# Patient Record
Sex: FEMALE
[De-identification: names, ages, dates, MRNs, and addresses within clinical notes are randomized per-mention and may not be internally consistent; named-entity substitution may affect disease eponyms.]

---

## 2020-11-28 ENCOUNTER — HOSPITAL ENCOUNTER (INPATIENT)
Dept: HOSPITAL 92 - ERS | Age: 55
LOS: 5 days | Discharge: HOME | DRG: 177 | End: 2020-12-03
Attending: HOSPITALIST | Admitting: INTERNAL MEDICINE
Payer: COMMERCIAL

## 2020-11-28 VITALS — BODY MASS INDEX: 30 KG/M2

## 2020-11-28 DIAGNOSIS — Z79.82: ICD-10-CM

## 2020-11-28 DIAGNOSIS — E11.65: ICD-10-CM

## 2020-11-28 DIAGNOSIS — Z79.84: ICD-10-CM

## 2020-11-28 DIAGNOSIS — R19.7: ICD-10-CM

## 2020-11-28 DIAGNOSIS — J96.01: ICD-10-CM

## 2020-11-28 DIAGNOSIS — I10: ICD-10-CM

## 2020-11-28 DIAGNOSIS — E66.9: ICD-10-CM

## 2020-11-28 DIAGNOSIS — J12.89: ICD-10-CM

## 2020-11-28 DIAGNOSIS — U07.1: Primary | ICD-10-CM

## 2020-11-28 LAB
ALBUMIN SERPL BCG-MCNC: 3.5 G/DL (ref 3.5–5)
ALP SERPL-CCNC: 111 U/L (ref 40–110)
ALT SERPL W P-5'-P-CCNC: 16 U/L (ref 8–55)
ANION GAP SERPL CALC-SCNC: 25 MMOL/L (ref 10–20)
AST SERPL-CCNC: 24 U/L (ref 5–34)
BASOPHILS # BLD AUTO: 0.1 THOU/UL (ref 0–0.2)
BASOPHILS NFR BLD AUTO: 0.5 % (ref 0–1)
BILIRUB SERPL-MCNC: 0.6 MG/DL (ref 0.2–1.2)
BUN SERPL-MCNC: 15 MG/DL (ref 9.8–20.1)
CALCIUM SERPL-MCNC: 9.3 MG/DL (ref 7.8–10.44)
CHLORIDE SERPL-SCNC: 94 MMOL/L (ref 98–107)
CK SERPL-CCNC: 18 U/L (ref 29–168)
CO2 SERPL-SCNC: 21 MMOL/L (ref 22–29)
CREAT CL PREDICTED SERPL C-G-VRATE: 0 ML/MIN (ref 70–130)
EOSINOPHIL # BLD AUTO: 0.1 THOU/UL (ref 0–0.7)
EOSINOPHIL NFR BLD AUTO: 0.5 % (ref 0–10)
GLOBULIN SER CALC-MCNC: 5.4 G/DL (ref 2.4–3.5)
GLUCOSE SERPL-MCNC: 354 MG/DL (ref 70–105)
HGB BLD-MCNC: 17.1 G/DL (ref 12–16)
LIPASE SERPL-CCNC: 50 U/L (ref 8–78)
LYMPHOCYTES # BLD: 2.8 THOU/UL (ref 1.2–3.4)
LYMPHOCYTES NFR BLD AUTO: 20.2 % (ref 21–51)
MCH RBC QN AUTO: 31.9 PG (ref 27–31)
MCV RBC AUTO: 91.5 FL (ref 78–98)
MONOCYTES # BLD AUTO: 0.6 THOU/UL (ref 0.11–0.59)
MONOCYTES NFR BLD AUTO: 4.4 % (ref 0–10)
NEUTROPHILS # BLD AUTO: 10.1 THOU/UL (ref 1.4–6.5)
NEUTROPHILS NFR BLD AUTO: 74.4 % (ref 42–75)
PLATELET # BLD AUTO: 347 THOU/UL (ref 130–400)
POTASSIUM SERPL-SCNC: 4 MMOL/L (ref 3.5–5.1)
RBC # BLD AUTO: 5.37 MILL/UL (ref 4.2–5.4)
SARS-COV-2 RNA RESP QL NAA+PROBE: DETECTED
SODIUM SERPL-SCNC: 136 MMOL/L (ref 136–145)
TROPONIN I SERPL DL<=0.01 NG/ML-MCNC: (no result) NG/ML (ref ?–0.03)
TROPONIN I SERPL DL<=0.01 NG/ML-MCNC: 0.03 NG/ML (ref ?–0.03)
WBC # BLD AUTO: 13.6 THOU/UL (ref 4.8–10.8)

## 2020-11-28 PROCEDURE — 85379 FIBRIN DEGRADATION QUANT: CPT

## 2020-11-28 PROCEDURE — 85025 COMPLETE CBC W/AUTO DIFF WBC: CPT

## 2020-11-28 PROCEDURE — 80053 COMPREHEN METABOLIC PANEL: CPT

## 2020-11-28 PROCEDURE — 83690 ASSAY OF LIPASE: CPT

## 2020-11-28 PROCEDURE — 87040 BLOOD CULTURE FOR BACTERIA: CPT

## 2020-11-28 PROCEDURE — 80048 BASIC METABOLIC PNL TOTAL CA: CPT

## 2020-11-28 PROCEDURE — 96365 THER/PROPH/DIAG IV INF INIT: CPT

## 2020-11-28 PROCEDURE — 84484 ASSAY OF TROPONIN QUANT: CPT

## 2020-11-28 PROCEDURE — 87046 STOOL CULTR AEROBIC BACT EA: CPT

## 2020-11-28 PROCEDURE — 87045 FECES CULTURE AEROBIC BACT: CPT

## 2020-11-28 PROCEDURE — 93005 ELECTROCARDIOGRAM TRACING: CPT

## 2020-11-28 PROCEDURE — 8E0ZXY6 ISOLATION: ICD-10-PCS | Performed by: INTERNAL MEDICINE

## 2020-11-28 PROCEDURE — 83880 ASSAY OF NATRIURETIC PEPTIDE: CPT

## 2020-11-28 PROCEDURE — 87804 INFLUENZA ASSAY W/OPTIC: CPT

## 2020-11-28 PROCEDURE — 82550 ASSAY OF CK (CPK): CPT

## 2020-11-28 PROCEDURE — 96366 THER/PROPH/DIAG IV INF ADDON: CPT

## 2020-11-28 PROCEDURE — 83605 ASSAY OF LACTIC ACID: CPT

## 2020-11-28 PROCEDURE — 71275 CT ANGIOGRAPHY CHEST: CPT

## 2020-11-28 PROCEDURE — U0002 COVID-19 LAB TEST NON-CDC: HCPCS

## 2020-11-28 PROCEDURE — 86140 C-REACTIVE PROTEIN: CPT

## 2020-11-28 PROCEDURE — 87449 NOS EACH ORGANISM AG IA: CPT

## 2020-11-28 PROCEDURE — 87086 URINE CULTURE/COLONY COUNT: CPT

## 2020-11-28 PROCEDURE — 82728 ASSAY OF FERRITIN: CPT

## 2020-11-28 PROCEDURE — 96375 TX/PRO/DX INJ NEW DRUG ADDON: CPT

## 2020-11-28 PROCEDURE — 80076 HEPATIC FUNCTION PANEL: CPT

## 2020-11-28 PROCEDURE — 71045 X-RAY EXAM CHEST 1 VIEW: CPT

## 2020-11-28 PROCEDURE — 36415 COLL VENOUS BLD VENIPUNCTURE: CPT

## 2020-11-28 PROCEDURE — 36416 COLLJ CAPILLARY BLOOD SPEC: CPT

## 2020-11-28 PROCEDURE — 87427 SHIGA-LIKE TOXIN AG IA: CPT

## 2020-11-28 RX ADMIN — INSULIN GLARGINE SCH MLS: 100 INJECTION, SOLUTION SUBCUTANEOUS at 23:03

## 2020-11-28 RX ADMIN — ALBUTEROL SULFATE SCH PUFF: 108 INHALANT RESPIRATORY (INHALATION) at 20:18

## 2020-11-28 NOTE — RAD
Frontal radiograph chest:

11/28/2020



COMPARISON: None



HISTORY: Cough and shortness of breath with generalized weakness



FINDINGS: Prominent extensive bilateral interstitial and alveolar/ground glass opacity. No pneumothor
ax, lobar consolidation, or alveolar edema.



IMPRESSION: Nonspecific diffuse interstitial and alveolar opacity/groundglass opacity. Findings are s
uspicious for Covid pneumonia/atypical infectious pneumonitis in the proper clinical setting.



Reported By: Joselito Garrison 

Electronically Signed:  11/28/2020 12:57 PM

## 2020-11-28 NOTE — HP
REASON FOR ADMISSION:  COVID-19 pneumonia, acute respiratory failure with hypoxia.



HISTORY OF PRESENTING ILLNESS:  The patient gives history of having dry coughing

spells and feeling nauseous on Friday.  She lives with her .  Her  was

not sick.  This progressively got worse and patient also developed diarrhea.  She is

having perfuse watery diarrhea as soon as she eats.  There was no blood in it.

There was no nausea or vomiting, but patient was exhausted, having diarrhea, and did

not want to eat.  As her shortness of breath got worse and she found herself

difficult to ambulate in the house, patient called EMS and was brought here.  On

arrival, patient had CT angio and chest x-ray done which showed bilateral

infiltrates and a COVID-19 PCR rapid test is positive.  Has had mild fevers at home. 



PAST MEDICAL AND SURGICAL HISTORY:  History of diabetes mellitus type 2, mild

hypertension, obesity.  No prior surgical history per patient. 



CURRENT MEDICATIONS:  Patient does not recall her medications and we will try to

obtain from her pharmacy shortly. 



ALLERGIES:  NO KNOWN DRUG ALLERGIES.



PERSONAL HISTORY:  Does not abuse alcohol or drugs.  No history of smoking.



FAMILY HISTORY:  No history of cancer in the family.



CODE STATUS:  Full.  Power of  is her .



REVIEW OF SYSTEMS:  CONSTITUTIONAL:  Negative for weight loss or gain, ability to

conduct usual activities. 

SKIN:  Negative for rash, itching. 

EYES:  Negative for double vision, pain. 

ENT/MOUTH:  Negative for nose bleeding, neck stiffness, pain, tenderness. 

CARDIOVASCULAR:  Negative for palpitations, dyspnea on exertion, orthopnea. 

RESPIRATORY:  Negative for shortness of breath, wheezing, cough, hemoptysis, fever

or night sweats. 

GASTROINTESTINAL:  Negative for poor appetite, abdominal pain, heartburn, nausea,

vomiting, constipation, or diarrhea. 

GENITOURINARY:  Negative for urgency, frequency, dysuria, nocturia. 

MUSCULOSKELETAL:  Negative for pain, swelling. 

NEUROLOGIC/PSYCHIATRIC:  Negative for anxiety, depression. 

ALLERGY/IMMUNOLOGIC:  Negative for skin rash, bleeding tendency.



PHYSICAL EXAMINATION:

GENERAL:  Patient is a 55-year-old female, who is currently not in any acute

distress and is tolerating high-flow oxygen. 

VITAL SIGNS:  Blood pressure 160/100, pulse 116 per minute, respiratory rate 24 per

minute, temperature 98.9 degrees Fahrenheit.  She was saturating 88% on room air and

is 100% on high-flow. 

NECK:  Supple.  No elevated JVD. 

HEENT:  Eyes; extraocular muscles intact.  Pupils reacting to light.  Oral cavity,

mucous membranes are dry.  No exudates or congestion. 

CARDIOVASCULAR:  S1, S2 heard.  Tachycardic.  No murmur. 

RESPIRATORY:  Air entry 1+ bilateral.  Coarse rales plus bilateral.  Rhonchi plus

bilateral.  No wheezes. 

ABDOMEN:  Soft.  Bowel sounds heard.  No tenderness, rigidity, or guarding. 

EXTREMITIES:  No peripheral edema or calf tenderness. 

VASCULAR:  Peripheral pulses 1+ bilateral.  No ischemic ulcerations or gangrene. 

CENTRAL NERVOUS SYSTEM:  No gross focal motor deficits noted.  Patient is alert,

awake, oriented well. 

PSYCHIATRIC:  Patient's mood is euthymic.  No hallucinations or delusions.



LABORATORY DATA:  COVID-19 PCR is detected.  Influenza A and B antigens are

negative.  CT angio chest done shows no evidence of PE.  There is extensive

nonspecific severe interstitial and alveolar opacity throughout both lungs, highly

suspicious for COVID.  BUN is 15, creatinine 0.8, serum glucose 354, lactic acid

2.3, serum bicarb 21.  AST and ALT within normal limits.  Alkaline phosphatase is

111.  CK level is 18.  BNP less than 10.  Albumin 3.5.  Lipase is 50.  D-dimer 2.79.

 White count of 13, H and H 17 and 49, platelet count 347, MCV is 91 with 74%

neutrophils. 



CLINICAL IMPRESSION AND PLAN:  Patient will be admitted to medical floor for

coronavirus disease 2019 pneumonia with acute respiratory failure with hypoxia.

Patient has severe infiltrates bilaterally and likely might get worse.  She has

multiple risk factors for progression as well.  Her diabetes is uncontrolled at

present.  She will be placed on Lantus 20 units subcu twice daily.  Patient's

duration of illness is around 8 days now.  She will be started on remdesivir as

well.  She is on dexamethasone 6 mg IV daily, DuoNeb.  We will also obtain stool

studies to make sure she is not having any infectious diarrhea including Clostridium

difficile.  She has not taken any recent antibiotics as far as she knows.  We will

consult Dr. Montero for Infectious Disease as well.  We will continue to closely

monitor her on medical floor.  Patient is clearly aware of her severe coronavirus

disease 2019 pneumonia on the CAT scan. 







Job ID:  [id number]

## 2020-11-28 NOTE — CT
CT angiogram chest:

11/28/2020



COMPARISON: None



HISTORY: Recent chest radiograph demonstrates findings consistent with diffuse Covid pneumonia. Cough
 with shortness of breath



TECHNIQUE: Axial CT imaging at 2.5 mm intervals through the chest with IV contrast using CT angiogram
 protocol. Coronal and sagittal 3-D reformatted imaging obtained



FINDINGS: Imaged upper abdomen grossly unremarkable. No pleural, pericardial, or mediastinal fluid. S
ubcarinal adenopathy measures 1.5 cm. Right hilar adenopathy measures 1 cm. No evidence for acute

pulmonary arterial embolism.



Extensive interstitial and alveolar/ground glass opacity noted throughout both lungs. No acute osseou
s abnormality.



IMPRESSION: Extensive nonspecific severe interstitial and alveolar opacity throughout both lungs high
ly suspicious for Covid pneumonia in the proper clinical setting. Short-term follow-up imaging of

the chest following treatment advised. No evidence for pulmonary arterial embolism.



Reported By: Joselito Garrison 

Electronically Signed:  11/28/2020 2:30 PM

## 2020-11-29 LAB
ALBUMIN SERPL BCG-MCNC: 2.9 G/DL (ref 3.5–5)
ALP SERPL-CCNC: 88 U/L (ref 40–110)
ALT SERPL W P-5'-P-CCNC: 11 U/L (ref 8–55)
ANION GAP SERPL CALC-SCNC: 21 MMOL/L (ref 10–20)
AST SERPL-CCNC: 17 U/L (ref 5–34)
BASOPHILS # BLD AUTO: 0 THOU/UL (ref 0–0.2)
BASOPHILS NFR BLD AUTO: 0 % (ref 0–1)
BILIRUB SERPL-MCNC: 0.4 MG/DL (ref 0.2–1.2)
BUN SERPL-MCNC: 14 MG/DL (ref 9.8–20.1)
CALCIUM SERPL-MCNC: 8.4 MG/DL (ref 7.8–10.44)
CHLORIDE SERPL-SCNC: 102 MMOL/L (ref 98–107)
CO2 SERPL-SCNC: 16 MMOL/L (ref 22–29)
CREAT CL PREDICTED SERPL C-G-VRATE: 99 ML/MIN (ref 70–130)
EOSINOPHIL # BLD AUTO: 0 THOU/UL (ref 0–0.7)
EOSINOPHIL NFR BLD AUTO: 0.3 % (ref 0–10)
GLOBULIN SER CALC-MCNC: 4.6 G/DL (ref 2.4–3.5)
GLUCOSE SERPL-MCNC: 325 MG/DL (ref 70–105)
HGB BLD-MCNC: 14.9 G/DL (ref 12–16)
LYMPHOCYTES # BLD: 1.8 THOU/UL (ref 1.2–3.4)
LYMPHOCYTES NFR BLD AUTO: 23.9 % (ref 21–51)
MCH RBC QN AUTO: 31.9 PG (ref 27–31)
MCV RBC AUTO: 92.9 FL (ref 78–98)
MONOCYTES # BLD AUTO: 0.4 THOU/UL (ref 0.11–0.59)
MONOCYTES NFR BLD AUTO: 5.7 % (ref 0–10)
NEUTROPHILS # BLD AUTO: 5.1 THOU/UL (ref 1.4–6.5)
NEUTROPHILS NFR BLD AUTO: 70.1 % (ref 42–75)
PLATELET # BLD AUTO: 316 THOU/UL (ref 130–400)
POTASSIUM SERPL-SCNC: 4.2 MMOL/L (ref 3.5–5.1)
RBC # BLD AUTO: 4.66 MILL/UL (ref 4.2–5.4)
SODIUM SERPL-SCNC: 135 MMOL/L (ref 136–145)
WBC # BLD AUTO: 7.3 THOU/UL (ref 4.8–10.8)

## 2020-11-29 PROCEDURE — XW033E5 INTRODUCTION OF REMDESIVIR ANTI-INFECTIVE INTO PERIPHERAL VEIN, PERCUTANEOUS APPROACH, NEW TECHNOLOGY GROUP 5: ICD-10-PCS | Performed by: INTERNAL MEDICINE

## 2020-11-29 RX ADMIN — GUAIFENESIN AND DEXTROMETHORPHAN PRN ML: 100; 10 SYRUP ORAL at 17:32

## 2020-11-29 RX ADMIN — INSULIN LISPRO PRN UNIT: 100 INJECTION, SOLUTION INTRAVENOUS; SUBCUTANEOUS at 05:34

## 2020-11-29 RX ADMIN — INSULIN LISPRO SCH UNIT: 100 INJECTION, SOLUTION INTRAVENOUS; SUBCUTANEOUS at 11:55

## 2020-11-29 RX ADMIN — ALBUTEROL SULFATE SCH PUFF: 108 INHALANT RESPIRATORY (INHALATION) at 12:37

## 2020-11-29 RX ADMIN — INSULIN GLARGINE SCH MLS: 100 INJECTION, SOLUTION SUBCUTANEOUS at 08:37

## 2020-11-29 RX ADMIN — INSULIN LISPRO PRN UNIT: 100 INJECTION, SOLUTION INTRAVENOUS; SUBCUTANEOUS at 12:36

## 2020-11-29 RX ADMIN — ALBUTEROL SULFATE SCH PUFF: 108 INHALANT RESPIRATORY (INHALATION) at 18:21

## 2020-11-29 RX ADMIN — ALBUTEROL SULFATE SCH PUFF: 108 INHALANT RESPIRATORY (INHALATION) at 01:50

## 2020-11-29 RX ADMIN — GUAIFENESIN AND DEXTROMETHORPHAN PRN ML: 100; 10 SYRUP ORAL at 08:25

## 2020-11-29 RX ADMIN — INSULIN GLARGINE SCH MLS: 100 INJECTION, SOLUTION SUBCUTANEOUS at 19:55

## 2020-11-29 RX ADMIN — Medication SCH ML: at 20:07

## 2020-11-29 RX ADMIN — REMDESIVIR SCH MLS: 100 INJECTION, POWDER, LYOPHILIZED, FOR SOLUTION INTRAVENOUS at 16:12

## 2020-11-29 RX ADMIN — INSULIN LISPRO SCH UNIT: 100 INJECTION, SOLUTION INTRAVENOUS; SUBCUTANEOUS at 16:12

## 2020-11-29 RX ADMIN — ALBUTEROL SULFATE SCH PUFF: 108 INHALANT RESPIRATORY (INHALATION) at 05:53

## 2020-11-29 RX ADMIN — INSULIN LISPRO PRN UNIT: 100 INJECTION, SOLUTION INTRAVENOUS; SUBCUTANEOUS at 16:36

## 2020-11-29 NOTE — PDOC.HOSPP
- Subjective


Encounter Date: 11/29/20


Encounter Time: 10:30


Subjective: 


Patient up in bed no complaints.





- Objective


Vital Signs & Weight: 


                             Vital Signs (12 hours)











  Temp Pulse Resp BP Pulse Ox


 


 11/29/20 12:30  97.9 F  98  18  127/86  100


 


 11/29/20 08:20  97.7 F  91  20  126/85  98


 


 11/29/20 04:50  97.7 F  89  20  113/77  95








                                     Weight











Weight                         153 lb 12.8 oz














I&O: 


                                        











 11/28/20 11/29/20 11/30/20





 06:59 06:59 06:59


 


Intake Total  500 


 


Balance  500 











Result Diagrams: 


                                 11/29/20 06:40





                                 11/29/20 06:40


Additional Labs: 


                                   Accuchecks











  11/29/20 11/29/20 11/28/20





  12:17 04:46 23:11


 


POC Glucose  378 H  344 H  343 H














Hospitalist ROS





- Review of Systems


Cardiovascular: denies: chest pain, palpitations, orthopnea, paroxysmal noc. d

yspnea, edema, light headedness, other


Gastrointestinal: denies: nausea, vomiting, abdominal pain, diarrhea, 

constipation, melena, hematochezia, other


Genitourinary: denies: dysuria, frequency, incontinence, hematuria, retention, 

other





- Medication


Medications: 


Active Medications











Generic Name Dose Route Start Last Admin





  Trade Name Freq  PRN Reason Stop Dose Admin


 


Albuterol Sulfate  2 puff  11/28/20 19:00  11/29/20 12:37





  Albuterol 200 Puff (6.7gm Inhaler)  INH   2 puff





  [id number] JOANNA   Administration


 


Enoxaparin Sodium  40 mg  11/29/20 09:00  11/29/20 08:25





  Enoxaparin Sodium 40 Mg/0.4 Ml Syringe  SC   40 mg





  0900 JOANNA   Administration


 


Famotidine  20 mg  11/28/20 21:00  11/29/20 08:25





  Famotidine 20 Mg Tab  PO   20 mg





  BID JOANNA   Administration


 


Guaifenesin/Dextromethorphan  15 ml  11/28/20 14:10  11/29/20 08:25





  Guaifenesin Dm 100-10/5 Ml Udcup  PO   15 ml





  Q4H PRN   Administration





  Cough  


 


Dexamethasone 6 mg/ Sodium  50.6 mls @ 100 mls/hr  11/29/20 09:00  11/29/20 0

8:37





  Chloride  IVPB   50.6 mls





  DAILY JOANNA   Administration


 


Insulin Human Lispro  0 units  11/28/20 14:10  11/29/20 12:36





  Humalog 300 Units/3 Ml Vial  SC   10 unit





  .MODERATE SLIDING SC PRN   Administration





  Moderate Correctional Scale  


 


Insulin Human Lispro  8 units  11/29/20 12:00  11/29/20 11:55





  Humalog 300 Units/3 Ml Vial  SC   8 unit





  TID-WM JOANNA   Administration














- Exam


Neck: negative: supple, symmetric, no JVD, no thyromegaly, no lymphadenopathy, 

no carotid bruit, JVD


Heart: negative: RRR, no murmur, no gallops, no rubs, normal peripheral pulses, 

irregular, diminshed peripheral pulses, murmur present, II/IV, III/IV


Respiratory: negative: CTAB, no wheezes, no rales, no ronchi, normal chest 

expansion, no tachypnea, normal percussion, rales, rhonchi, tachypneic, wheezes


Gastrointestinal: negative: soft, non-tender, non-distended, normal bowel sounds

, no palpable masses, no hepatomegaly, no splenomegaly, no bruit, no guarding, 

no rigidity, tender to palpation, distended, diminished bowl sounds, voluntary 

guarding





Hosp A/P


(1) Acute respiratory failure with hypoxia


Code(s): J96.01 - ACUTE RESPIRATORY FAILURE WITH HYPOXIA   Status: Acute   





(2) COVID-19


Code(s): U07.1 - COVID-19   Status: Acute   





(3) Diabetes


Code(s): E11.9 - TYPE 2 DIABETES MELLITUS WITHOUT COMPLICATIONS   Status: Acute 

 





- Plan





Patient has no diarrhea.  Currently on high flow.  We will continue remdesivir 

and steroids.  She is on DVT prophylaxis.  Patient's insulin dose increase.

## 2020-11-29 NOTE — CON
DATE OF CONSULTATION:  11/29/2020



REASON FOR CONSULTATION:  COVID pneumonia.



HISTORY OF PRESENT ILLNESS:  A 55-year-old history of type 2 diabetes, 
hypertension

with COVID-19, the illness developed 8 days before admission and gradually 
became

worse.  She had some diarrhea which resolved and anosmia.  No headaches.  No

abdominal pain.  No genitourinary symptoms.  Initial findings; /99, pulse 
130,

temperature 98.9, O2 saturation 88 on room air.  She appeared in distress,

uncomfortable, alert.  Exam showed tachycardia.  Lungs were described as clear.

Abdomen is soft, not tender.  Initial findings included also sodium of 136,

creatinine 0.83, glucose 354, lactic acid 2.3, CK 18, and SARS-CoV detected PCR.

Blood cultures negative.  Urine culture and influenza test negative.  Chest x-
ray

with diffuse bilateral infiltrates.  CT scan confirmed that she is receiving

Decadron and remdesivir, enoxaparin prophylactic dose.  Feeling a little better,

still on high-flow nasal cannula.  No headaches.  Mild to moderate shortness of

breath.  Unable to eat.  Voiding without difficulty. 



PAST MEDICAL HISTORY:  Type 2 diabetes, hypertension.



PAST SURGICAL HISTORY:  Negative.



SOCIAL HISTORY:  Lives in the area with family.  Never smoker.



ALLERGIES:  NONE.



MEDICATIONS:  In addition to remdesivir, Decadron, enoxaparin, she is on p.r.n.

medications, insulin and inhalers. 



PHYSICAL EXAMINATION:

VITAL SIGNS:  Normal.  She is satting at 100% with 40 L/minute flow rate and

high-flow nasal cannula O2. 

SKIN:  Normal.  There is no lymphadenopathy. 

HEENT:  Ocular movements conjugate.  Oral cavity normal. 

LUNGS:  symmetric breath sounds without obvious crackles or wheezing. 

HEART:  S1 and S2, regular rate.  No S3 or S4. 

ABDOMEN:  Soft, not distended or tender.  No ascites.  No bladder distention. 

EXTREMITIES:  No joint inflammatory activity.  No edema.  Moves all extremities

equally.  Pulses 1+ in dorsalis pedis. 

NEURO:  Nonfocal.



LABORATORY DATA:  Followup white cell count 7.3, hemoglobin 14, platelets 316.

D-dimer 2.79. 



ASSESSMENT:  Diabetes type 2, hypertension, and moderate to severe COVID 
pneumonia.

Quite pronounced bilateral diffuse infiltrates and high-flow nasal cannula.

Continue remdesivir, Decadron, and enoxaparin prophylaxis.  Daily marker 
monitoring. 







Job ID:  [id number]



[facility]

## 2020-11-30 LAB
ALBUMIN SERPL BCG-MCNC: 2.8 G/DL (ref 3.5–5)
ALP SERPL-CCNC: 101 U/L (ref 40–110)
ALT SERPL W P-5'-P-CCNC: 10 U/L (ref 8–55)
ANION GAP SERPL CALC-SCNC: 12 MMOL/L (ref 10–20)
AST SERPL-CCNC: 11 U/L (ref 5–34)
BILIRUB DIRECT SERPL-MCNC: 0.2 MG/DL (ref 0.1–0.3)
BILIRUB SERPL-MCNC: 0.4 MG/DL (ref 0.2–1.2)
BUN SERPL-MCNC: 17 MG/DL (ref 9.8–20.1)
CALCIUM SERPL-MCNC: 8.7 MG/DL (ref 7.8–10.44)
CHLORIDE SERPL-SCNC: 103 MMOL/L (ref 98–107)
CO2 SERPL-SCNC: 28 MMOL/L (ref 22–29)
CREAT CL PREDICTED SERPL C-G-VRATE: 102 ML/MIN (ref 70–130)
CRP SERPL-MCNC: 1.97 MG/DL
GLUCOSE SERPL-MCNC: 322 MG/DL (ref 70–105)
POTASSIUM SERPL-SCNC: 3.5 MMOL/L (ref 3.5–5.1)
SODIUM SERPL-SCNC: 139 MMOL/L (ref 136–145)

## 2020-11-30 RX ADMIN — ALBUTEROL SULFATE SCH PUFF: 108 INHALANT RESPIRATORY (INHALATION) at 12:42

## 2020-11-30 RX ADMIN — INSULIN GLARGINE SCH MLS: 100 INJECTION, SOLUTION SUBCUTANEOUS at 20:03

## 2020-11-30 RX ADMIN — INSULIN LISPRO PRN UNIT: 100 INJECTION, SOLUTION INTRAVENOUS; SUBCUTANEOUS at 16:47

## 2020-11-30 RX ADMIN — ALBUTEROL SULFATE SCH PUFF: 108 INHALANT RESPIRATORY (INHALATION) at 08:42

## 2020-11-30 RX ADMIN — INSULIN LISPRO SCH UNIT: 100 INJECTION, SOLUTION INTRAVENOUS; SUBCUTANEOUS at 08:43

## 2020-11-30 RX ADMIN — ALBUTEROL SULFATE SCH: 108 INHALANT RESPIRATORY (INHALATION) at 01:37

## 2020-11-30 RX ADMIN — INSULIN GLARGINE SCH MLS: 100 INJECTION, SOLUTION SUBCUTANEOUS at 08:48

## 2020-11-30 RX ADMIN — ALBUTEROL SULFATE SCH PUFF: 108 INHALANT RESPIRATORY (INHALATION) at 19:42

## 2020-11-30 RX ADMIN — Medication SCH ML: at 08:49

## 2020-11-30 RX ADMIN — REMDESIVIR SCH MLS: 100 INJECTION, POWDER, LYOPHILIZED, FOR SOLUTION INTRAVENOUS at 15:44

## 2020-11-30 RX ADMIN — INSULIN LISPRO SCH UNIT: 100 INJECTION, SOLUTION INTRAVENOUS; SUBCUTANEOUS at 16:46

## 2020-11-30 RX ADMIN — Medication SCH ML: at 20:03

## 2020-11-30 RX ADMIN — INSULIN LISPRO PRN UNIT: 100 INJECTION, SOLUTION INTRAVENOUS; SUBCUTANEOUS at 19:47

## 2020-11-30 RX ADMIN — INSULIN LISPRO PRN UNIT: 100 INJECTION, SOLUTION INTRAVENOUS; SUBCUTANEOUS at 12:03

## 2020-11-30 RX ADMIN — INSULIN LISPRO PRN UNIT: 100 INJECTION, SOLUTION INTRAVENOUS; SUBCUTANEOUS at 04:45

## 2020-11-30 RX ADMIN — INSULIN LISPRO SCH UNIT: 100 INJECTION, SOLUTION INTRAVENOUS; SUBCUTANEOUS at 11:57

## 2020-11-30 NOTE — PRG
DATE OF SERVICE:  11/30/2020



SUBJECTIVE:  The patient ate 100% of her breakfast and is feeling somewhat better.

Still a little bit weak when she walks, but has a steady gait.  Mild-to-moderate

dyspnea only with exertion.  Still coughing intermittently.  No abdominal pain.  No

diarrhea.  Voiding without difficulty. 



OBJECTIVE:  VITAL SIGNS:  Temperature has been normal, saturating better with no

downgrade in her O2 supplementation requirements to 3 L nasal cannula from high-flow

nasal cannula.  She is saturating 96% to 98%.  Otherwise, vital signs are normal.

She is not tachycardic and she is not tachypneic. 

LUNGS:  Clear. 

HEART:  S1 and S2.  Regular rate. 

ABDOMEN:  Soft, not distended. 

:  No bladder distention. 

NEUROLOGIC:  Moves all extremities equally.



LABORATORY DATA:  White cell count has not been repeated.  D-dimer is down to 2.26.

Ferritin is 1358.  C-reactive protein first time measured 1.97. 



ASSESSMENT AND DISCUSSION:  Type 2 diabetes with moderate-to-severe COVID pneumonia

with early improvement on remdesivir, Decadron, Lovenox, and oxygen supplementation.

 Continue current management. 







Job ID:  [id number]

## 2020-11-30 NOTE — PDOC.HOSPP
- Subjective


Encounter Date: 11/30/20


Encounter Time: 10:30


Subjective: 


Up in bed currently on nasal cannula





- Objective


Vital Signs & Weight: 


                             Vital Signs (12 hours)











  Temp Pulse Resp BP Pulse Ox


 


 11/30/20 12:31  97.4 F L  83  18  120/77  96


 


 11/30/20 09:07  97.4 F L  72  18  128/79  98


 


 11/30/20 09:00      98








                                     Weight











Weight                         153 lb 12.8 oz














I&O: 


                                        











 11/29/20 11/30/20 12/01/20





 06:59 06:59 06:59


 


Intake Total 500 1120 


 


Balance 500 1120 











Result Diagrams: 


                                 11/29/20 06:40





                                 11/30/20 06:48


Additional Labs: 


                                   Accuchecks











  11/30/20 11/30/20 11/29/20





  12:01 04:41 19:16


 


POC Glucose  259 H  378 H  361 H














  11/29/20





  16:26


 


POC Glucose  376 H














Hospitalist ROS





- Review of Systems


Respiratory: reports: shortness of breath


Cardiovascular: denies: chest pain, palpitations, orthopnea, paroxysmal noc. 

dyspnea, edema, light headedness, other


Gastrointestinal: denies: nausea, vomiting, abdominal pain, diarrhea, 

constipation, melena, hematochezia, other


Genitourinary: denies: dysuria, frequency, incontinence, hematuria, retention, 

other





- Medication


Medications: 


Active Medications











Generic Name Dose Route Start Last Admin





  Trade Name Freq  PRN Reason Stop Dose Admin


 


Acetaminophen  650 mg  11/28/20 14:10  11/29/20 19:55





  Acetaminophen 325 Mg Tab  PO   650 mg





  Q4H PRN   Administration





  Headache/Fever/Mild Pain (1-3)  


 


Albuterol Sulfate  2 puff  11/28/20 19:00  11/30/20 12:42





  Albuterol 200 Puff (6.7gm Inhaler)  INH   2 puff





  [id number] JOANNA   Administration


 


Enoxaparin Sodium  40 mg  11/29/20 09:00  11/30/20 10:55





  Enoxaparin Sodium 40 Mg/0.4 Ml Syringe  SC   40 mg





  0900 JOANNA   Administration


 


Famotidine  20 mg  11/28/20 21:00  11/30/20 08:46





  Famotidine 20 Mg Tab  PO   20 mg





  BID JOANNA   Administration


 


Guaifenesin/Dextromethorphan  15 ml  11/28/20 14:10  11/29/20 17:32





  Guaifenesin Dm 100-10/5 Ml Udcup  PO   15 ml





  Q4H PRN   Administration





  Cough  


 


Dexamethasone 6 mg/ Sodium  50.6 mls @ 100 mls/hr  11/29/20 09:00  11/30/20 

08:49





  Chloride  IVPB   50.6 mls





  DAILY JOANNA   Administration


 


Remdesivir 100 mg/ Sodium  250 mls @ 250 mls/hr  11/29/20 16:00  11/29/20 16:12





  Chloride  IV  12/02/20 16:59  250 mls





  1600 JOANNA   Administration


 


Insulin Glargine 25 units/  0.25 mls @ 0 mls/hr  11/30/20 09:00  11/30/20 08:48





  Miscellaneous Medication  SC   0.25 mls





  QAM JOANNA   Administration


 


Insulin Glargine 25 units/  0.25 mls @ 0 mls/hr  11/29/20 21:00  11/29/20 19:55





  Miscellaneous Medication  SC   0.25 mls





  HS JOANNA   Administration


 


Insulin Human Lispro  0 units  11/28/20 14:10  11/30/20 12:03





  Humalog 300 Units/3 Ml Vial  SC   6 unit





  .MODERATE SLIDING SC PRN   Administration





  Moderate Correctional Scale  


 


Insulin Human Lispro  8 units  11/29/20 12:00  11/30/20 11:57





  Humalog 300 Units/3 Ml Vial  SC   8 unit





  TID-WM JOANNA   Administration


 


Sodium Chloride  10 ml  11/29/20 21:00  11/30/20 08:49





  Flush - Normal Saline 10 Ml Syringe  IVF   10 ml





  Q12HR JOANNA   Administration














- Exam


Neck: negative: supple, symmetric, no JVD, no thyromegaly, no lymphadenopathy, 

no carotid bruit, JVD


Heart: negative: RRR, no murmur, no gallops, no rubs, normal peripheral pulses, 

irregular, diminshed peripheral pulses, murmur present, II/IV, III/IV


Respiratory: negative: CTAB, no wheezes, no rales, no ronchi, normal chest 

expansion, no tachypnea, normal percussion, rales, rhonchi, tachypneic, wheezes





Hosp A/P


(1) Acute respiratory failure with hypoxia


Code(s): J96.01 - ACUTE RESPIRATORY FAILURE WITH HYPOXIA   Status: Acute   





(2) COVID-19


Code(s): U07.1 - COVID-19   Status: Acute   





(3) Diabetes


Code(s): E11.9 - TYPE 2 DIABETES MELLITUS WITHOUT COMPLICATIONS   Status: Acute 

 





- Plan





Patient has no diarrhea.  Currently on high flow.  We will continue remdesivir 

and steroids.  She is on DVT prophylaxis.  Patient's insulin dose increase.





11/30 we will continue remdesivir and steroids.  Patient has been off the high 

flow via nasal cannula will monitor.  She is able to tolerate overall and has 

been not having any diarrhea.

## 2020-12-01 LAB
ALBUMIN SERPL BCG-MCNC: 2.5 G/DL (ref 3.5–5)
ALP SERPL-CCNC: 85 U/L (ref 40–110)
ALT SERPL W P-5'-P-CCNC: 9 U/L (ref 8–55)
ANION GAP SERPL CALC-SCNC: 16 MMOL/L (ref 10–20)
AST SERPL-CCNC: 19 U/L (ref 5–34)
BILIRUB DIRECT SERPL-MCNC: 0.1 MG/DL (ref 0.1–0.3)
BILIRUB SERPL-MCNC: 0.3 MG/DL (ref 0.2–1.2)
BUN SERPL-MCNC: 18 MG/DL (ref 9.8–20.1)
CALCIUM SERPL-MCNC: 8.4 MG/DL (ref 7.8–10.44)
CHLORIDE SERPL-SCNC: 105 MMOL/L (ref 98–107)
CO2 SERPL-SCNC: 23 MMOL/L (ref 22–29)
CREAT CL PREDICTED SERPL C-G-VRATE: 114 ML/MIN (ref 70–130)
CRP SERPL-MCNC: 0.89 MG/DL
GLUCOSE SERPL-MCNC: 215 MG/DL (ref 70–105)
POTASSIUM SERPL-SCNC: 3.6 MMOL/L (ref 3.5–5.1)
SODIUM SERPL-SCNC: 140 MMOL/L (ref 136–145)

## 2020-12-01 RX ADMIN — ALBUTEROL SULFATE SCH PUFF: 108 INHALANT RESPIRATORY (INHALATION) at 18:28

## 2020-12-01 RX ADMIN — ALBUTEROL SULFATE SCH PUFF: 108 INHALANT RESPIRATORY (INHALATION) at 15:35

## 2020-12-01 RX ADMIN — INSULIN LISPRO SCH UNIT: 100 INJECTION, SOLUTION INTRAVENOUS; SUBCUTANEOUS at 13:13

## 2020-12-01 RX ADMIN — GUAIFENESIN AND DEXTROMETHORPHAN PRN ML: 100; 10 SYRUP ORAL at 23:33

## 2020-12-01 RX ADMIN — ALBUTEROL SULFATE SCH PUFF: 108 INHALANT RESPIRATORY (INHALATION) at 06:44

## 2020-12-01 RX ADMIN — INSULIN GLARGINE SCH MLS: 100 INJECTION, SOLUTION SUBCUTANEOUS at 10:37

## 2020-12-01 RX ADMIN — INSULIN GLARGINE SCH MLS: 100 INJECTION, SOLUTION SUBCUTANEOUS at 21:47

## 2020-12-01 RX ADMIN — INSULIN LISPRO PRN UNIT: 100 INJECTION, SOLUTION INTRAVENOUS; SUBCUTANEOUS at 05:32

## 2020-12-01 RX ADMIN — Medication SCH ML: at 21:45

## 2020-12-01 RX ADMIN — Medication SCH ML: at 10:38

## 2020-12-01 RX ADMIN — REMDESIVIR SCH MLS: 100 INJECTION, POWDER, LYOPHILIZED, FOR SOLUTION INTRAVENOUS at 16:49

## 2020-12-01 RX ADMIN — INSULIN LISPRO SCH UNIT: 100 INJECTION, SOLUTION INTRAVENOUS; SUBCUTANEOUS at 10:35

## 2020-12-01 RX ADMIN — INSULIN LISPRO PRN UNIT: 100 INJECTION, SOLUTION INTRAVENOUS; SUBCUTANEOUS at 16:50

## 2020-12-01 RX ADMIN — INSULIN LISPRO SCH UNIT: 100 INJECTION, SOLUTION INTRAVENOUS; SUBCUTANEOUS at 16:49

## 2020-12-01 RX ADMIN — ALBUTEROL SULFATE SCH PUFF: 108 INHALANT RESPIRATORY (INHALATION) at 01:28

## 2020-12-01 RX ADMIN — INSULIN LISPRO PRN UNIT: 100 INJECTION, SOLUTION INTRAVENOUS; SUBCUTANEOUS at 13:13

## 2020-12-01 NOTE — PDOC.HOSPP
- Subjective


Encounter Date: 12/01/20


Encounter Time: 11:45


Subjective: 


Patient up in bed no complaints.





- Objective


Vital Signs & Weight: 


                             Vital Signs (12 hours)











  Temp Pulse Resp BP Pulse Ox Pulse Ox Pulse Ox


 


 12/01/20 12:00  98.5 F  77  20  118/75  97  


 


 12/01/20 10:13       90 L  93 L


 


 12/01/20 08:00  97.6 F  70  22 H  136/83  98  














  Pulse Ox


 


 12/01/20 12:00 


 


 12/01/20 10:13  92 L


 


 12/01/20 08:00 








                                     Weight











Weight                         153 lb 12.8 oz














I&O: 


                                        











 11/30/20 12/01/20 12/02/20





 06:59 06:59 06:59


 


Intake Total 1120 1290 


 


Balance 1120 1290 











Result Diagrams: 


                                 11/29/20 06:40





                                 12/01/20 06:07


Additional Labs: 


                                   Accuchecks











  12/01/20 11/30/20 11/30/20





  05:20 19:46 15:49


 


POC Glucose  226 H  370 H  405 H














Hospitalist ROS





- Review of Systems


Cardiovascular: denies: chest pain, palpitations, orthopnea, paroxysmal noc. dy

spnea, edema, light headedness, other


Gastrointestinal: denies: nausea, vomiting, abdominal pain, diarrhea, 

constipation, melena, hematochezia, other


Genitourinary: denies: dysuria, frequency, incontinence, hematuria, retention, 

other





- Medication


Medications: 


Active Medications











Generic Name Dose Route Start Last Admin





  Trade Name Freq  PRN Reason Stop Dose Admin


 


Acetaminophen  650 mg  11/28/20 14:10  11/29/20 19:55





  Acetaminophen 325 Mg Tab  PO   650 mg





  Q4H PRN   Administration





  Headache/Fever/Mild Pain (1-3)  


 


Albuterol Sulfate  2 puff  11/28/20 19:00  12/01/20 06:44





  Albuterol 200 Puff (6.7gm Inhaler)  INH   2 puff





  [id number] JOANNA   Administration


 


Enoxaparin Sodium  40 mg  11/29/20 09:00  12/01/20 10:40





  Enoxaparin Sodium 40 Mg/0.4 Ml Syringe  SC   40 mg





  0900 JOANNA   Administration


 


Famotidine  20 mg  11/28/20 21:00  12/01/20 10:36





  Famotidine 20 Mg Tab  PO   20 mg





  BID JOANNA   Administration


 


Guaifenesin/Dextromethorphan  15 ml  11/28/20 14:10  11/29/20 17:32





  Guaifenesin Dm 100-10/5 Ml Udcup  PO   15 ml





  Q4H PRN   Administration





  Cough  


 


Dexamethasone 6 mg/ Sodium  50.6 mls @ 100 mls/hr  11/29/20 09:00  12/01/20 

10:37





  Chloride  IVPB   50.6 mls





  DAILY JOANNA   Administration


 


Remdesivir 100 mg/ Sodium  250 mls @ 250 mls/hr  11/29/20 16:00  11/30/20 15:44





  Chloride  IV  12/02/20 16:59  250 mls





  1600 JOANNA   Administration


 


Insulin Glargine 25 units/  0.25 mls @ 0 mls/hr  11/30/20 09:00  12/01/20 10:37





  Miscellaneous Medication  SC   0.25 mls





  QAM JOANNA   Administration


 


Insulin Glargine 25 units/  0.25 mls @ 0 mls/hr  11/29/20 21:00  11/30/20 20:03





  Miscellaneous Medication  SC   0.25 mls





  HS JOANNA   Administration


 


Insulin Human Lispro  0 units  11/28/20 14:10  12/01/20 13:13





  Humalog 300 Units/3 Ml Vial  SC   4 unit





  .MODERATE SLIDING SC PRN   Administration





  Moderate Correctional Scale  


 


Insulin Human Lispro  0 units  11/28/20 14:10  11/30/20 19:47





  Humalog 300 Units/3 Ml Vial  SC   5 unit





  .BEDTIME SLIDING SC PRN   Administration





  Bedtime Correctional Scale  


 


Insulin Human Lispro  8 units  11/29/20 12:00  12/01/20 13:13





  Humalog 300 Units/3 Ml Vial  SC   8 unit





  TID-WM JOANNA   Administration


 


Sodium Chloride  10 ml  11/29/20 21:00  12/01/20 10:38





  Flush - Normal Saline 10 Ml Syringe  IVF   10 ml





  Q12HR JOANNA   Administration














- Exam


Heart: negative: RRR, no murmur, no gallops, no rubs, normal peripheral pulses, 

irregular, diminshed peripheral pulses, murmur present, II/IV, III/IV


Respiratory: negative: CTAB, no wheezes, no rales, no ronchi, normal chest 

expansion, no tachypnea, normal percussion, rales, rhonchi, tachypneic, wheezes


Gastrointestinal: negative: soft, non-tender, non-distended, normal bowel 

sounds, no palpable masses, no hepatomegaly, no splenomegaly, no bruit, no 

guarding, no rigidity, tender to palpation, distended, diminished bowl sounds, 

voluntary guarding


Extremities: negative: no cyanosis, no clubbing, no edema, 1+ LE edema, 2+ LE 

edema, clubbing





Hosp A/P


(1) Acute respiratory failure with hypoxia


Code(s): J96.01 - ACUTE RESPIRATORY FAILURE WITH HYPOXIA   Status: Acute   





(2) COVID-19


Code(s): U07.1 - COVID-19   Status: Acute   





(3) Diabetes


Code(s): E11.9 - TYPE 2 DIABETES MELLITUS WITHOUT COMPLICATIONS   Status: Acute 

 





- Plan





Patient has no diarrhea.  Currently on high flow.  We will continue remdesivir 

and steroids.  She is on DVT prophylaxis.  Patient's insulin dose increase.





11/30 we will continue remdesivir and steroids.  Patient has been off the high 

flow via nasal cannula will monitor.  She is able to tolerate overall and has 

been not having any diarrhea.





12/1 patient will finish her remdesivir tomorrow.  Asked nurse to ambulate the 

patient to see how she does.  We will need oxygen for home.  We will continue 

steroids and DVT prophylaxis.

## 2020-12-02 LAB
ALBUMIN SERPL BCG-MCNC: 2.7 G/DL (ref 3.5–5)
ALP SERPL-CCNC: 94 U/L (ref 40–110)
ALT SERPL W P-5'-P-CCNC: 12 U/L (ref 8–55)
ANION GAP SERPL CALC-SCNC: 13 MMOL/L (ref 10–20)
AST SERPL-CCNC: 16 U/L (ref 5–34)
BILIRUB DIRECT SERPL-MCNC: 0.2 MG/DL (ref 0.1–0.3)
BILIRUB SERPL-MCNC: 0.4 MG/DL (ref 0.2–1.2)
BUN SERPL-MCNC: 14 MG/DL (ref 9.8–20.1)
CALCIUM SERPL-MCNC: 8 MG/DL (ref 7.8–10.44)
CHLORIDE SERPL-SCNC: 101 MMOL/L (ref 98–107)
CO2 SERPL-SCNC: 30 MMOL/L (ref 22–29)
CREAT CL PREDICTED SERPL C-G-VRATE: 112 ML/MIN (ref 70–130)
CRP SERPL-MCNC: 0.6 MG/DL
GLUCOSE SERPL-MCNC: 172 MG/DL (ref 70–105)
POTASSIUM SERPL-SCNC: 3.2 MMOL/L (ref 3.5–5.1)
SODIUM SERPL-SCNC: 141 MMOL/L (ref 136–145)

## 2020-12-02 RX ADMIN — Medication SCH ML: at 20:04

## 2020-12-02 RX ADMIN — ALBUTEROL SULFATE SCH PUFF: 108 INHALANT RESPIRATORY (INHALATION) at 12:46

## 2020-12-02 RX ADMIN — INSULIN LISPRO SCH UNIT: 100 INJECTION, SOLUTION INTRAVENOUS; SUBCUTANEOUS at 07:59

## 2020-12-02 RX ADMIN — ALBUTEROL SULFATE SCH: 108 INHALANT RESPIRATORY (INHALATION) at 02:08

## 2020-12-02 RX ADMIN — Medication SCH ML: at 08:01

## 2020-12-02 RX ADMIN — ALBUTEROL SULFATE SCH PUFF: 108 INHALANT RESPIRATORY (INHALATION) at 17:58

## 2020-12-02 RX ADMIN — INSULIN LISPRO PRN UNIT: 100 INJECTION, SOLUTION INTRAVENOUS; SUBCUTANEOUS at 06:44

## 2020-12-02 RX ADMIN — INSULIN LISPRO PRN UNIT: 100 INJECTION, SOLUTION INTRAVENOUS; SUBCUTANEOUS at 20:07

## 2020-12-02 RX ADMIN — INSULIN LISPRO SCH UNIT: 100 INJECTION, SOLUTION INTRAVENOUS; SUBCUTANEOUS at 16:25

## 2020-12-02 RX ADMIN — REMDESIVIR SCH MLS: 100 INJECTION, POWDER, LYOPHILIZED, FOR SOLUTION INTRAVENOUS at 16:25

## 2020-12-02 RX ADMIN — INSULIN LISPRO SCH UNIT: 100 INJECTION, SOLUTION INTRAVENOUS; SUBCUTANEOUS at 12:45

## 2020-12-02 RX ADMIN — INSULIN LISPRO PRN UNIT: 100 INJECTION, SOLUTION INTRAVENOUS; SUBCUTANEOUS at 16:26

## 2020-12-02 RX ADMIN — ALBUTEROL SULFATE SCH PUFF: 108 INHALANT RESPIRATORY (INHALATION) at 07:06

## 2020-12-02 RX ADMIN — INSULIN GLARGINE SCH MLS: 100 INJECTION, SOLUTION SUBCUTANEOUS at 09:57

## 2020-12-02 RX ADMIN — INSULIN GLARGINE SCH MLS: 100 INJECTION, SOLUTION SUBCUTANEOUS at 20:04

## 2020-12-02 NOTE — PRG
DATE OF SERVICE:  12/02/2020



SUBJECTIVE:  Feels a little better.  Still with a sensation of tightness in the

chest when she coughs.  No abdominal pain.  No neurological symptoms. 



OBJECTIVE:  VITAL SIGNS:  She has been afebrile, breathing at 16 times a minute,

pulse 98, O2 saturations ranging from 94 to 98, supplementation down to 3 L nasal

cannula.  After brief exercise, saturations dropped from 98 to 94, but come back to

98 to 100 quickly. 

LUNGS:  With a few scattered crackles.  No wheezing. 

HEART:  S1 and S2, regular rate. 

ABDOMEN:  Soft, not distended. 

NEUROLOGIC:  Nonfocal.



LABORATORY DATA:  Ferritin and CRP are decreasing, so is the D-dimer. 



Finishing up her remdesivir, continues on Decadron and enoxaparin prophylaxis.



ASSESSMENT AND DISCUSSION:  Type 2 diabetes, moderate-to-severe COVID pneumonia,

still continuing improvement, the patient is not desaturating a lot when she

exercises, which is a good prognostic sign. 







Job ID:  [id number]

## 2020-12-03 VITALS — TEMPERATURE: 98.5 F

## 2020-12-03 VITALS — SYSTOLIC BLOOD PRESSURE: 100 MMHG | DIASTOLIC BLOOD PRESSURE: 71 MMHG

## 2020-12-03 LAB
ALBUMIN SERPL BCG-MCNC: 2.8 G/DL (ref 3.5–5)
ALP SERPL-CCNC: 97 U/L (ref 40–110)
ALT SERPL W P-5'-P-CCNC: 13 U/L (ref 8–55)
ANION GAP SERPL CALC-SCNC: 14 MMOL/L (ref 10–20)
AST SERPL-CCNC: 18 U/L (ref 5–34)
BILIRUB DIRECT SERPL-MCNC: 0.2 MG/DL (ref 0.1–0.3)
BILIRUB SERPL-MCNC: 0.3 MG/DL (ref 0.2–1.2)
BUN SERPL-MCNC: 15 MG/DL (ref 9.8–20.1)
CALCIUM SERPL-MCNC: 8.7 MG/DL (ref 7.8–10.44)
CHLORIDE SERPL-SCNC: 103 MMOL/L (ref 98–107)
CO2 SERPL-SCNC: 28 MMOL/L (ref 22–29)
CREAT CL PREDICTED SERPL C-G-VRATE: 112 ML/MIN (ref 70–130)
CRP SERPL-MCNC: (no result) MG/DL
GLUCOSE SERPL-MCNC: 252 MG/DL (ref 70–105)
POTASSIUM SERPL-SCNC: 4 MMOL/L (ref 3.5–5.1)
SODIUM SERPL-SCNC: 141 MMOL/L (ref 136–145)

## 2020-12-03 RX ADMIN — ALBUTEROL SULFATE SCH PUFF: 108 INHALANT RESPIRATORY (INHALATION) at 00:03

## 2020-12-03 RX ADMIN — INSULIN LISPRO SCH: 100 INJECTION, SOLUTION INTRAVENOUS; SUBCUTANEOUS at 10:38

## 2020-12-03 RX ADMIN — ALBUTEROL SULFATE SCH PUFF: 108 INHALANT RESPIRATORY (INHALATION) at 05:36

## 2020-12-03 RX ADMIN — INSULIN LISPRO PRN UNIT: 100 INJECTION, SOLUTION INTRAVENOUS; SUBCUTANEOUS at 05:36

## 2020-12-03 RX ADMIN — INSULIN LISPRO SCH UNIT: 100 INJECTION, SOLUTION INTRAVENOUS; SUBCUTANEOUS at 12:39

## 2020-12-03 RX ADMIN — ALBUTEROL SULFATE SCH PUFF: 108 INHALANT RESPIRATORY (INHALATION) at 15:41

## 2020-12-03 RX ADMIN — INSULIN GLARGINE SCH MLS: 100 INJECTION, SOLUTION SUBCUTANEOUS at 09:53

## 2020-12-03 RX ADMIN — Medication SCH ML: at 09:53

## 2020-12-05 NOTE — EKG
Test Reason : DYSPNEA

Blood Pressure : ***/*** mmHG

Vent. Rate : 120 BPM     Atrial Rate : 120 BPM

   P-R Int : 132 ms          QRS Dur : 092 ms

    QT Int : 322 ms       P-R-T Axes : 027 005 249 degrees

   QTc Int : 455 ms

 

Sinus tachycardia

Possible Left atrial enlargement

Left ventricular hypertrophy with repolarization abnormality

Abnormal ECG

 

Confirmed by BREANA NG, SALOMÓN ([id number]),  MONIQUE DUNCAN ([id number]) on 12/5/2020 2:52:00 PM

 

Referred By:             Confirmed By:SALOMÓN TOUSSAINT MD
